# Patient Record
Sex: FEMALE | Race: OTHER | HISPANIC OR LATINO | Employment: UNEMPLOYED | URBAN - METROPOLITAN AREA
[De-identification: names, ages, dates, MRNs, and addresses within clinical notes are randomized per-mention and may not be internally consistent; named-entity substitution may affect disease eponyms.]

---

## 2022-08-07 ENCOUNTER — APPOINTMENT (EMERGENCY)
Dept: RADIOLOGY | Facility: HOSPITAL | Age: 72
End: 2022-08-07
Payer: COMMERCIAL

## 2022-08-07 ENCOUNTER — HOSPITAL ENCOUNTER (EMERGENCY)
Facility: HOSPITAL | Age: 72
Discharge: HOME | End: 2022-08-07
Attending: EMERGENCY MEDICINE | Admitting: EMERGENCY MEDICINE
Payer: COMMERCIAL

## 2022-08-07 VITALS
TEMPERATURE: 97.6 F | OXYGEN SATURATION: 96 % | SYSTOLIC BLOOD PRESSURE: 123 MMHG | RESPIRATION RATE: 16 BRPM | DIASTOLIC BLOOD PRESSURE: 79 MMHG | WEIGHT: 163.6 LBS | HEART RATE: 67 BPM

## 2022-08-07 DIAGNOSIS — M25.561 KNEE PAIN, RIGHT: Primary | ICD-10-CM

## 2022-08-07 PROCEDURE — 73564 X-RAY EXAM KNEE 4 OR MORE: CPT

## 2022-08-07 PROCEDURE — 99284 EMERGENCY DEPT VISIT MOD MDM: CPT

## 2022-08-07 PROCEDURE — 99283 EMERGENCY DEPT VISIT LOW MDM: CPT

## 2022-08-07 RX ORDER — ACETAMINOPHEN 325 MG/1
650 TABLET ORAL ONCE
Status: COMPLETED | OUTPATIENT
Start: 2022-08-07 | End: 2022-08-07

## 2022-08-07 RX ORDER — IBUPROFEN 400 MG/1
400 TABLET ORAL ONCE
Status: COMPLETED | OUTPATIENT
Start: 2022-08-07 | End: 2022-08-07

## 2022-08-07 RX ORDER — ACETAMINOPHEN 325 MG/1
650 TABLET ORAL EVERY 6 HOURS PRN
Qty: 30 TABLET | Refills: 0 | Status: SHIPPED | OUTPATIENT
Start: 2022-08-07

## 2022-08-07 RX ORDER — NAPROXEN 500 MG/1
500 TABLET ORAL 2 TIMES DAILY WITH MEALS
Qty: 10 TABLET | Refills: 0 | Status: SHIPPED | OUTPATIENT
Start: 2022-08-07 | End: 2023-08-07

## 2022-08-07 RX ADMIN — IBUPROFEN 400 MG: 400 TABLET ORAL at 15:19

## 2022-08-07 RX ADMIN — ACETAMINOPHEN 650 MG: 325 TABLET ORAL at 15:18

## 2022-08-07 NOTE — DISCHARGE INSTRUCTIONS
Use knee immobilizer until follow up with orthopedics  Take medications as needed for symptoms  Return to ED for new or worsening symptoms as discussed

## 2022-08-07 NOTE — ED PROVIDER NOTES
History  Chief Complaint   Patient presents with    Knee Pain     Right - fell in May and went to [place], had xrays done  No fractures  Pain is severe and she will eventually will return home in [place]       71 y o  F with PMH of HTN presents to ED c/o R knee pain x 2 months s/p fall from standing onto sidewalk  Was evaluated for it in [place]  Has not done anything for it or taken any medications  Everything makes it worse, nothing makes it better  Pain with walking, reports it is hard to get up  Reports swelling of knee since the event   No other leg swelling  Reports decreased range of motion, cannot bend all the way  Denies fever, chills, erythema, warmth weakness, tingling  History provided by:  Patient and relative   used: Yes        None       Past Medical History:   Diagnosis Date    Hypertension        Past Surgical History:   Procedure Laterality Date    HYSTERECTOMY         History reviewed  No pertinent family history  I have reviewed and agree with the history as documented  E-Cigarette/Vaping     E-Cigarette/Vaping Substances     Social History     Tobacco Use    Smoking status: Never Smoker    Smokeless tobacco: Never Used   Substance Use Topics    Alcohol use: Never    Drug use: Never       Review of Systems   Constitutional: Negative for chills and fever  Respiratory: Negative for shortness of breath  Cardiovascular: Negative for chest pain, palpitations and leg swelling  Gastrointestinal: Negative for abdominal pain, nausea and vomiting  Genitourinary: Negative for decreased urine volume  Musculoskeletal: Positive for arthralgias, gait problem and joint swelling  Negative for neck stiffness  Skin: Negative for color change, rash and wound  Neurological: Negative for syncope, weakness, light-headedness and numbness  Psychiatric/Behavioral: Negative for confusion  All other systems reviewed and are negative        Physical Exam  Physical Exam  Vitals and nursing note reviewed  Constitutional:       General: She is awake  She is not in acute distress  Appearance: Normal appearance  She is not ill-appearing, toxic-appearing or diaphoretic  HENT:      Head: Normocephalic and atraumatic  Jaw: No swelling  Right Ear: External ear normal       Left Ear: External ear normal       Mouth/Throat:      Lips: Pink  Mouth: Mucous membranes are moist    Eyes:      General: Lids are normal  Vision grossly intact  Right eye: No discharge  Left eye: No discharge  Conjunctiva/sclera: Conjunctivae normal    Cardiovascular:      Rate and Rhythm: Normal rate and regular rhythm  Pulses:           Dorsalis pedis pulses are 2+ on the right side and 2+ on the left side  Posterior tibial pulses are 2+ on the right side and 2+ on the left side  Heart sounds: Normal heart sounds  Pulmonary:      Effort: Pulmonary effort is normal  No respiratory distress  Breath sounds: Normal breath sounds  Abdominal:      General: There is no distension  Musculoskeletal:      Cervical back: Neck supple  Right hip: Normal       Left hip: Normal       Right upper leg: Normal       Left upper leg: Normal       Right knee: Swelling and bony tenderness present  No erythema or crepitus  Decreased range of motion (decreased active flexion is slightly limited, pain with passive flexion)  Tenderness present over the medial joint line, lateral joint line and patellar tendon  Left knee: Normal       Right lower leg: Normal  No swelling  No edema  Left lower leg: Normal  No edema  Right ankle: Normal       Left ankle: Normal       Right foot: Normal       Left foot: Normal    Skin:     General: Skin is warm and dry  Capillary Refill: Capillary refill takes less than 2 seconds  Coloration: Skin is not jaundiced or pale  Findings: No bruising, ecchymosis, erythema or rash     Neurological:      Mental Status: She is alert       Gait: Gait normal    Psychiatric:         Mood and Affect: Mood normal          Behavior: Behavior normal          Thought Content: Thought content normal          Vital Signs  ED Triage Vitals [08/07/22 1445]   Temperature Pulse Respirations Blood Pressure SpO2   97 6 °F (36 4 °C) 67 16 123/79 96 %      Temp Source Heart Rate Source Patient Position - Orthostatic VS BP Location FiO2 (%)   Tympanic -- -- -- --      Pain Score       10 - Worst Possible Pain           Vitals:    08/07/22 1445   BP: 123/79   Pulse: 67         Visual Acuity      ED Medications  Medications   ibuprofen (MOTRIN) tablet 400 mg (400 mg Oral Given 8/7/22 1519)   acetaminophen (TYLENOL) tablet 650 mg (650 mg Oral Given 8/7/22 1518)       Diagnostic Studies  Results Reviewed     None                 XR knee 4+ views Right injury   Final Result by Nella Watts MD (08/08 2265)      No acute osseous abnormality  Tricompartmental osteoarthritis  Workstation performed: [id number]                    Procedures  Procedures         ED Course  ED Course as of 08/08/22 1325   Sun Aug 07, 2022   1542 Subacute fx vs  OA                                             MDM  Number of Diagnoses or Management Options  Knee pain, right  Diagnosis management comments: Afebrile  no skin changes  No signs of infection  no effusion  Strength intact  Distal perfusion intact  Sensation intact  is still able to bear weight, ambulate, with pain  Xray ordered  no acute fx visualized on wet read, likely OA, however cannot r/o subacute fx  Given knee immobilizer, crutches  Ortho follow up given  All imaging and/or lab testing discussed with patient, strict return to ED precautions discussed  Patient recommended to follow up promptly with appropriate outpatient provider  Patient and/or family members verbalizes understanding and agrees with plan   Patient and/or family members were given opportunity to ask questions, all questions were answered at this time  Patient is stable for discharge      Portions of the record may have been created with voice recognition software  Occasional wrong word or "sound a like" substitutions may have occurred due to the inherent limitations of voice recognition software  Read the chart carefully and recognize, using context, where substitutions have occurred  Amount and/or Complexity of Data Reviewed  Tests in the radiology section of CPT®: reviewed and ordered        Disposition  Final diagnoses:   Knee pain, right     Time reflects when diagnosis was documented in both MDM as applicable and the Disposition within this note     Time User Action Codes Description Comment    8/7/2022  3:45 PM Leticia Hatchet Add [[id number]] Knee pain, right       ED Disposition     ED Disposition   Discharge    Condition   Stable    Date/Time   Sun Aug 7, 2022  3:46 PM    Comment   Yakelin Miguel discharge to home/self care  Follow-up Information     Follow up With Specialties Details Why Contact Info Additional [address] [facility] \[facility]\"" Orthopedic Surgery Schedule an appointment as soon as possible for a visit  For follow up regarding your symptoms [address] [facility]  [address] [facility] [address]  [address] [facility], [address] [facility], [address] [facility], [address] [place] [id number] [phone]          Discharge Medication List as of 8/7/2022  3:47 PM      START taking these medications    Details   acetaminophen (TYLENOL) 325 mg tablet Take 2 tablets (650 mg total) by mouth every 6 (six) hours as needed for mild pain, Starting Sun 8/7/2022, Print      naproxen (EC NAPROSYN) 500 MG EC tablet Take 1 tablet (500 mg total) by mouth 2 (two) times a day with meals, Starting Sun 8/7/2022, Until Mon 8/7/2023, Print             No discharge procedures on file      PDMP Review     None          ED Provider  Electronically Signed by           Katy Banks PA-C  08/08/22 9998

## 2022-08-29 ENCOUNTER — TELEPHONE (OUTPATIENT)
Dept: OBGYN CLINIC | Facility: MEDICAL CENTER | Age: 72
End: 2022-08-29

## 2022-08-29 NOTE — TELEPHONE ENCOUNTER
Spoke with Whit Dickerson at [facility]  As long as we are par with our local [employer] and it is billed under local [employer] our providers can see patient   Reference #[id number]

## 2022-10-27 ENCOUNTER — OFFICE VISIT (OUTPATIENT)
Dept: OBGYN CLINIC | Facility: CLINIC | Age: 72
End: 2022-10-27

## 2022-10-27 VITALS
SYSTOLIC BLOOD PRESSURE: 124 MMHG | DIASTOLIC BLOOD PRESSURE: 72 MMHG | BODY MASS INDEX: 30 KG/M2 | HEIGHT: 62 IN | WEIGHT: 163 LBS

## 2022-10-27 DIAGNOSIS — M17.11 PRIMARY OSTEOARTHRITIS OF RIGHT KNEE: Primary | ICD-10-CM

## 2022-10-27 RX ORDER — ATENOLOL 50 MG/1
50 TABLET ORAL DAILY
COMMUNITY

## 2022-10-27 RX ORDER — LIDOCAINE HYDROCHLORIDE 10 MG/ML
2 INJECTION, SOLUTION INFILTRATION; PERINEURAL
Status: COMPLETED | OUTPATIENT
Start: 2022-10-27 | End: 2022-10-27

## 2022-10-27 RX ORDER — BETAMETHASONE SODIUM PHOSPHATE AND BETAMETHASONE ACETATE 3; 3 MG/ML; MG/ML
6 INJECTION, SUSPENSION INTRA-ARTICULAR; INTRALESIONAL; INTRAMUSCULAR; SOFT TISSUE
Status: COMPLETED | OUTPATIENT
Start: 2022-10-27 | End: 2022-10-27

## 2022-10-27 RX ORDER — LANSOPRAZOLE 30 MG/1
30 CAPSULE, DELAYED RELEASE ORAL DAILY
COMMUNITY

## 2022-10-27 RX ADMIN — LIDOCAINE HYDROCHLORIDE 2 ML: 10 INJECTION, SOLUTION INFILTRATION; PERINEURAL at 11:01

## 2022-10-27 RX ADMIN — BETAMETHASONE SODIUM PHOSPHATE AND BETAMETHASONE ACETATE 6 MG: 3; 3 INJECTION, SUSPENSION INTRA-ARTICULAR; INTRALESIONAL; INTRAMUSCULAR; SOFT TISSUE at 11:01

## 2022-10-27 NOTE — PROGRESS NOTES
Patient Name:  Dana Vazquez  MRN:  [id number]    Assessment & Plan     Right knee DJD  1  Corticosteroid injection performed today into the right knee  2  Continue Advil as needed  3  Activities as tolerated modification to avoid pain  4  Recommend over-the-counter knee brace/sleeve  5  Advised corticosteroid injections may be performed every three months as indicated for pain  Patient may follow-up with me in three months for repeat injection at that time as indicated  Chief Complaint     Right knee pain    History of the Present Illness     Dana Vazquez is a 71 y o  female who reports to the office today for evaluation of her right knee  She notes an onset of pain approximately five months ago  She states she fell which resulted in pain in her right knee  Since then she notes persistent generalized pain in the knee with swelling and stiffness  Pain is worse with prolonged standing and walking as well as walking up and down steps  She also notes pain with transitioning from seated to standing position  She denies any weakness or instability  No numbness or tingling  No fevers or chills  She takes over-the-counter anti-inflammatories with mild improvement  Physical Exam     /72   Ht 5' 2" (1 575 m)   Wt 73 9 kg (163 lb)   BMI 29 81 kg/m²     Right knee:  No gross deformity  Skin intact  No erythema ecchymosis or swelling  Trace effusion  Diffuse tenderness to palpation  Range of motion 0-120 with discomfort and crepitation noted  Stable to varus and valgus stress without pain  Stable Lachman test   Negative posterior drawer test   Negative Marla's test     Eyes: Anicteric sclerae  ENT: Trachea midline  Lungs: Normal respiratory effort  CV: Capillary refill is less than 2 seconds  Skin: Intact without erythema  Lymph: No palpable lymphadenopathy  Neuro: Sensation is grossly intact to light touch  Psych: Mood and affect are appropriate      Data Review I have personally reviewed pertinent films in PACS, and my interpretation follows:    X-rays right knee 8/7/22:  Tricompartmental degenerative changes most severe in the patellofemoral compartment  Chondrocalcinosis also appreciated in the medial and lateral compartments  No acute osseous abnormalities  No fracture or dislocation  Past Medical History:   Diagnosis Date   • Hypertension        Past Surgical History:   Procedure Laterality Date   • HYSTERECTOMY         No Known Allergies    Current Outpatient Medications on File Prior to Visit   Medication Sig Dispense Refill   • acetaminophen (TYLENOL) 325 mg tablet Take 2 tablets (650 mg total) by mouth every 6 (six) hours as needed for mild pain 30 tablet 0   • atenolol (TENORMIN) 50 mg tablet Take 50 mg by mouth daily     • choline fenofibrate (TRILIPIX) 135 MG capsule Take 135 mg by mouth daily     • lansoprazole (PREVACID) 30 mg capsule Take 30 mg by mouth daily     • naproxen (EC NAPROSYN) 500 MG EC tablet Take 1 tablet (500 mg total) by mouth 2 (two) times a day with meals 10 tablet 0     No current facility-administered medications on file prior to visit  Social History     Tobacco Use   • Smoking status: Never Smoker   • Smokeless tobacco: Never Used   Substance Use Topics   • Alcohol use: Never   • Drug use: Never       History reviewed  No pertinent family history  Review of Systems     As stated in the HPI  All other systems reviewed and are negative        Large joint arthrocentesis: R knee  Procedure Details  Location: knee - R knee  Needle size: 22 G  Ultrasound guidance: no  Approach: anterolateral  Medications administered: 2 mL lidocaine 1 %; 6 mg betamethasone acetate-betamethasone sodium phosphate 6 (3-3) mg/mL    Patient tolerance: patient tolerated the procedure well with no immediate complications  Dressing:  Sterile dressing applied

## 2022-11-10 ENCOUNTER — APPOINTMENT (EMERGENCY)
Dept: RADIOLOGY | Facility: HOSPITAL | Age: 72
End: 2022-11-10

## 2022-11-10 ENCOUNTER — HOSPITAL ENCOUNTER (EMERGENCY)
Facility: HOSPITAL | Age: 72
Discharge: HOME | End: 2022-11-10
Attending: EMERGENCY MEDICINE

## 2022-11-10 VITALS
WEIGHT: 165.1 LBS | TEMPERATURE: 98.4 F | OXYGEN SATURATION: 99 % | SYSTOLIC BLOOD PRESSURE: 122 MMHG | DIASTOLIC BLOOD PRESSURE: 85 MMHG | HEART RATE: 79 BPM | BODY MASS INDEX: 30.2 KG/M2 | RESPIRATION RATE: 18 BRPM

## 2022-11-10 DIAGNOSIS — J18.9 COMMUNITY ACQUIRED PNEUMONIA: ICD-10-CM

## 2022-11-10 DIAGNOSIS — J06.9 URI WITH COUGH AND CONGESTION: Primary | ICD-10-CM

## 2022-11-10 LAB
ATRIAL RATE: 73 BPM
FLUAV RNA RESP QL NAA+PROBE: NEGATIVE
FLUBV RNA RESP QL NAA+PROBE: NEGATIVE
P AXIS: 47 DEGREES
PR INTERVAL: 170 MS
QRS AXIS: -44 DEGREES
QRSD INTERVAL: 76 MS
QT INTERVAL: 372 MS
QTC INTERVAL: 409 MS
RSV RNA RESP QL NAA+PROBE: NEGATIVE
SARS-COV-2 RNA RESP QL NAA+PROBE: NEGATIVE
T WAVE AXIS: 37 DEGREES
VENTRICULAR RATE: 73 BPM

## 2022-11-10 RX ORDER — AZITHROMYCIN 250 MG/1
TABLET, FILM COATED ORAL
Qty: 6 TABLET | Refills: 0 | Status: SHIPPED | OUTPATIENT
Start: 2022-11-10 | End: 2022-11-14

## 2022-11-10 RX ORDER — FLUTICASONE PROPIONATE 50 MCG
1 SPRAY, SUSPENSION (ML) NASAL DAILY
Qty: 16 G | Refills: 0 | Status: SHIPPED | OUTPATIENT
Start: 2022-11-10

## 2022-11-10 RX ORDER — BENZONATATE 100 MG/1
100 CAPSULE ORAL EVERY 8 HOURS
Qty: 21 CAPSULE | Refills: 0 | Status: SHIPPED | OUTPATIENT
Start: 2022-11-10

## 2022-11-10 RX ORDER — SENNOSIDES 8.6 MG
650 CAPSULE ORAL EVERY 8 HOURS PRN
Qty: 30 TABLET | Refills: 0 | Status: SHIPPED | OUTPATIENT
Start: 2022-11-10

## 2022-11-10 RX ORDER — ACETAMINOPHEN 325 MG/1
650 TABLET ORAL ONCE
Status: COMPLETED | OUTPATIENT
Start: 2022-11-10 | End: 2022-11-10

## 2022-11-10 RX ADMIN — ACETAMINOPHEN 650 MG: 325 TABLET ORAL at 13:07

## 2022-11-10 NOTE — DISCHARGE INSTRUCTIONS
Follow-up with primary care provider  Take Tylenol for fever  Take antibiotic as prescribed  Return to ED for new or worsening symptoms as discussed

## 2022-11-10 NOTE — ED PROVIDER NOTES
History  Chief Complaint   Patient presents with   • Sore Throat     Patient states she is feeling weak, fever, sore throat and cough for about one week  67-year-old female past medical history of hypertension presenting to emergency department complaining of A cough, sore throat, myalgias, rhinorrhea x1 week  Fever times 3 days  History provided by:  Patient   used: Yes    Sore Throat  Location:  Generalized  Associated symptoms: chills, cough, fever and rhinorrhea    Associated symptoms: no abdominal pain, no chest pain, no drooling, no ear discharge, no ear pain, no eye discharge, no headaches, no night sweats, no postnasal drip, no rash, no shortness of breath, no sinus congestion, no stridor, no trouble swallowing and no voice change    Risk factors: sick contacts        Prior to Admission Medications   Prescriptions Last Dose Informant Patient Reported? Taking?   acetaminophen (TYLENOL) 325 mg tablet   No No   Sig: Take 2 tablets (650 mg total) by mouth every 6 (six) hours as needed for mild pain   atenolol (TENORMIN) 50 mg tablet   Yes No   Sig: Take 50 mg by mouth daily   choline fenofibrate (TRILIPIX) 135 MG capsule   Yes No   Sig: Take 135 mg by mouth daily   lansoprazole (PREVACID) 30 mg capsule   Yes No   Sig: Take 30 mg by mouth daily   naproxen (EC NAPROSYN) 500 MG EC tablet   No No   Sig: Take 1 tablet (500 mg total) by mouth 2 (two) times a day with meals      Facility-Administered Medications: None       Past Medical History:   Diagnosis Date   • Hypertension        History reviewed  No pertinent surgical history  History reviewed  No pertinent family history  I have reviewed and agree with the history as documented      E-Cigarette/Vaping     E-Cigarette/Vaping Substances     Social History     Tobacco Use   • Smoking status: Never Smoker   • Smokeless tobacco: Never Used   Substance Use Topics   • Alcohol use: Never   • Drug use: Never       Review of Systems Constitutional: Positive for chills and fever  Negative for night sweats  HENT: Positive for rhinorrhea and sore throat  Negative for congestion, drooling, ear discharge, ear pain, postnasal drip, sinus pain, trouble swallowing and voice change  Eyes: Negative for pain, discharge and redness  Respiratory: Positive for cough  Negative for chest tightness, shortness of breath, wheezing and stridor  Cardiovascular: Negative for chest pain and leg swelling  Gastrointestinal: Negative for abdominal pain, diarrhea and vomiting  Genitourinary: Negative for dysuria, hematuria and urgency  Musculoskeletal: Positive for myalgias  Negative for arthralgias and joint swelling  Skin: Negative for color change and rash  Neurological: Negative for dizziness, syncope, weakness, numbness and headaches  All other systems reviewed and are negative  Physical Exam  Physical Exam  Vitals and nursing note reviewed  Constitutional:       General: She is awake  She is not in acute distress  Appearance: Normal appearance  She is well-developed  She is ill-appearing  She is not toxic-appearing or diaphoretic  HENT:      Head: Normocephalic and atraumatic  Jaw: No swelling  Right Ear: External ear normal       Left Ear: External ear normal       Nose: Rhinorrhea present  Mouth/Throat:      Lips: Pink  Mouth: Mucous membranes are moist  No oral lesions  Pharynx: Oropharynx is clear  Uvula midline  Posterior oropharyngeal erythema present  No oropharyngeal exudate or uvula swelling  Tonsils: No tonsillar exudate or tonsillar abscesses  Eyes:      General: Lids are normal  Vision grossly intact  Right eye: No discharge  Left eye: No discharge  Conjunctiva/sclera: Conjunctivae normal       Pupils: Pupils are equal, round, and reactive to light  Cardiovascular:      Rate and Rhythm: Normal rate and regular rhythm  Heart sounds: Normal heart sounds  Pulmonary:      Effort: Pulmonary effort is normal  No tachypnea, bradypnea, accessory muscle usage, respiratory distress or retractions  Breath sounds: No stridor or decreased air movement  Examination of the left-lower field reveals rales  Rhonchi and rales present  No decreased breath sounds or wheezing  Abdominal:      General: There is no distension  Palpations: Abdomen is soft  Tenderness: There is no abdominal tenderness  Musculoskeletal:      Cervical back: Neck supple  Right lower leg: No edema  Left lower leg: No edema  Lymphadenopathy:      Cervical: No cervical adenopathy  Skin:     General: Skin is warm and dry  Capillary Refill: Capillary refill takes less than 2 seconds  Coloration: Skin is not jaundiced or pale  Findings: No rash  Neurological:      Mental Status: She is alert  Gait: Gait normal    Psychiatric:         Mood and Affect: Mood normal          Behavior: Behavior normal          Thought Content:  Thought content normal          Vital Signs  ED Triage Vitals   Temperature Pulse Respirations Blood Pressure SpO2   11/10/22 1229 11/10/22 1229 11/10/22 1229 11/10/22 1229 11/10/22 1229   (!) 100 9 °F (38 3 °C) 81 20 125/76 97 %      Temp Source Heart Rate Source Patient Position - Orthostatic VS BP Location FiO2 (%)   11/10/22 1229 11/10/22 1229 11/10/22 1229 11/10/22 1229 --   Tympanic Monitor Sitting Left arm       Pain Score       11/10/22 1307       No Pain           Vitals:    11/10/22 1229 11/10/22 1432   BP: 125/76 122/85   Pulse: 81 79   Patient Position - Orthostatic VS: Sitting Sitting         Visual Acuity      ED Medications  Medications   acetaminophen (TYLENOL) tablet 650 mg (650 mg Oral Given 11/10/22 1307)       Diagnostic Studies  Results Reviewed     Procedure Component Value Units Date/Time    FLU/RSV/COVID - if FLU/RSV clinically relevant [[id number]]  (Normal) Collected: 11/10/22 1305    Lab Status: Final result Specimen: Nares from Nasopharyngeal Swab Updated: 11/10/22 1357     SARS-CoV-2 Negative     INFLUENZA A PCR Negative     INFLUENZA B PCR Negative     RSV PCR Negative    Narrative:      FOR PEDIATRIC PATIENTS - copy/paste COVID Guidelines URL to browser: [email or web address] [email or web address]  ashx    SARS-CoV-2 assay is a Nucleic Acid Amplification assay intended for the  qualitative detection of nucleic acid from SARS-CoV-2 in nasopharyngeal  swabs  Results are for the presumptive identification of SARS-CoV-2 RNA  Positive results are indicative of infection with SARS-CoV-2, the virus  causing COVID-19, but do not rule out bacterial infection or co-infection  with other viruses  Laboratories within the [place] and its  territories are required to report all positive results to the appropriate  public health authorities  Negative results do not preclude SARS-CoV-2  infection and should not be used as the sole basis for treatment or other  patient management decisions  Negative results must be combined with  clinical observations, patient history, and epidemiological information  This test has not been FDA cleared or approved  This test has been authorized by FDA under an Emergency Use Authorization  (EUA)  This test is only authorized for the duration of time the  declaration that circumstances exist justifying the authorization of the  emergency use of an in vitro diagnostic tests for detection of SARS-CoV-2  virus and/or diagnosis of COVID-19 infection under section 564(b)(1) of  the Act, 21 U  S C  508TWS-6(Z)(5), unless the authorization is terminated  or revoked sooner  The test has been validated but independent review by FDA  and CLIA is pending  Test performed using [employer] Gene[employer]pert: This RT-PCR assay targets N2,  a region unique to SARS-CoV-2  A conserved region in the E-gene was chosen  for pan-Sarbecovirus detection which includes SARS-CoV-2      According to CMS-2020-01-R, this platform meets the definition of high-throughput technology  XR chest 2 views   Final Result by Shirlean Babinski, MD (11/10 1532)      No acute cardiopulmonary disease  Workstation performed: [id number]                    Procedures  Procedures         ED Course  ED Course as of 11/11/22 2251   Thu Nov 10, 2022   1345 Procedure Note: EKG  Date/Time: 11/10/22 1:45 PM   Performed by: Nova Conde   Authorized by: oNva Conde  ECG interpreted by me, the ED Provider: yes   The EKG demonstrates:  Rate 73 bpm  Rhythm NSR  QTc 409 ms   No ST elevations/depressions                                               MDM  Number of Diagnoses or Management Options  Community acquired pneumonia  URI with cough and congestion  Diagnosis management comments: Febrile upon arrival   No hypotension or tachycardia  Denies chest pain or shortness of breath  No respiratory distress  Adventitious lung sounds noted left lower lobe  ECG without acute ischemic changes or dysrhythmia  Chest x-ray without discrete consolidation  However high clinical suspicion for pneumonia  Will treat with antibiotics  Symptoms in fevers improved prior to discharge  Tolerating p o  without difficulty  All imaging and/or lab testing discussed with patient, strict return to ED precautions discussed  Patient recommended to follow up promptly with appropriate outpatient provider  Patient and/or family members verbalizes understanding and agrees with plan  Patient and/or family members were given opportunity to ask questions, all questions were answered at this time  Patient is stable for discharge      Portions of the record may have been created with voice recognition software  Occasional wrong word or "sound a like" substitutions may have occurred due to the inherent limitations of voice recognition software  Read the chart carefully and recognize, using context, where substitutions have occurred  Amount and/or Complexity of Data Reviewed  Tests in the radiology section of CPT®: ordered and reviewed        Disposition  Final diagnoses:   URI with cough and congestion   Community acquired pneumonia     Time reflects when diagnosis was documented in both MDM as applicable and the Disposition within this note     Time User Action Codes Description Comment    11/10/2022  1:59 PM Ulices Favorite Add [J06 9] URI with cough and congestion     11/10/2022  1:59 PM Sharlot Favorite Add [J18 9] Community acquired pneumonia       ED Disposition     ED Disposition   Discharge    Condition   Stable    Date/Time   Thu Nov 10, 2022  2:06 PM    Comment   Yakelin Miguel discharge to home/self care                 Follow-up Information     Follow up With Specialties Details Why Contact Info Additional [facility] Schedule an appointment as soon as possible for a visit  For follow up regarding your symptoms [address], [address] [employer] [address]  [address], [address], [address], [place], [address]          Discharge Medication List as of 11/10/2022  2:08 PM      START taking these medications    Details   acetaminophen (TYLENOL) 650 mg CR tablet Take 1 tablet (650 mg total) by mouth every 8 (eight) hours as needed for mild pain, Starting u 11/10/2022, Normal      azithromycin (ZITHROMAX) 250 mg tablet Take 2 tablets today then 1 tablet daily x 4 days, Normal      benzonatate (TESSALON PERLES) 100 mg capsule Take 1 capsule (100 mg total) by mouth every 8 (eight) hours, Starting u 11/10/2022, Normal      fluticasone (FLONASE) 50 mcg/act nasal spray 1 spray into each nostril daily, Starting Thu 11/10/2022, Normal         CONTINUE these medications which have NOT CHANGED    Details   acetaminophen (TYLENOL) 325 mg tablet Take 2 tablets (650 mg total) by mouth every 6 (six) hours as needed for mild pain, Starting Sun 8/7/2022, Print      atenolol (TENORMIN) 50 mg tablet Take 50 mg by mouth daily, Historical Med      choline fenofibrate (TRILIPIX) 135 MG capsule Take 135 mg by mouth daily, Historical Med      lansoprazole (PREVACID) 30 mg capsule Take 30 mg by mouth daily, Historical Med      naproxen (EC NAPROSYN) 500 MG EC tablet Take 1 tablet (500 mg total) by mouth 2 (two) times a day with meals, Starting Sun 8/7/2022, Until Mon 8/7/2023, Print                 PDMP Review     None          ED Provider  Electronically Signed by           Kit Gonzalez PA-C  11/11/22 9181

## 2022-12-02 ENCOUNTER — TELEPHONE (OUTPATIENT)
Dept: FAMILY MEDICINE CLINIC | Facility: CLINIC | Age: 72
End: 2022-12-02

## 2022-12-02 NOTE — TELEPHONE ENCOUNTER
Patient does not live in [place].  She is here visiting and stated she will follow up with PCP in [facility].